# Patient Record
Sex: FEMALE | Race: WHITE | NOT HISPANIC OR LATINO | Employment: FULL TIME | URBAN - METROPOLITAN AREA
[De-identification: names, ages, dates, MRNs, and addresses within clinical notes are randomized per-mention and may not be internally consistent; named-entity substitution may affect disease eponyms.]

---

## 2017-10-26 ENCOUNTER — APPOINTMENT (OUTPATIENT)
Dept: WOMENS IMAGING | Facility: HOSPITAL | Age: 57
End: 2017-10-26

## 2017-10-26 PROCEDURE — 77063 BREAST TOMOSYNTHESIS BI: CPT | Performed by: RADIOLOGY

## 2017-10-26 PROCEDURE — 77067 SCR MAMMO BI INCL CAD: CPT | Performed by: RADIOLOGY

## 2020-09-23 ENCOUNTER — APPOINTMENT (OUTPATIENT)
Dept: WOMENS IMAGING | Facility: HOSPITAL | Age: 60
End: 2020-09-23

## 2020-09-23 PROCEDURE — 77067 SCR MAMMO BI INCL CAD: CPT | Performed by: RADIOLOGY

## 2021-03-22 ENCOUNTER — BULK ORDERING (OUTPATIENT)
Dept: CASE MANAGEMENT | Facility: OTHER | Age: 61
End: 2021-03-22

## 2021-03-22 DIAGNOSIS — Z23 IMMUNIZATION DUE: ICD-10-CM

## 2021-11-17 ENCOUNTER — APPOINTMENT (OUTPATIENT)
Dept: WOMENS IMAGING | Facility: HOSPITAL | Age: 61
End: 2021-11-17

## 2021-11-17 PROCEDURE — 77067 SCR MAMMO BI INCL CAD: CPT | Performed by: RADIOLOGY

## 2021-11-17 PROCEDURE — 77063 BREAST TOMOSYNTHESIS BI: CPT | Performed by: RADIOLOGY

## 2023-11-10 ENCOUNTER — PREP FOR SURGERY (OUTPATIENT)
Dept: OTHER | Facility: HOSPITAL | Age: 63
End: 2023-11-10
Payer: COMMERCIAL

## 2023-11-10 DIAGNOSIS — Z12.11 SCREENING FOR COLON CANCER: Primary | ICD-10-CM

## 2023-11-10 DIAGNOSIS — Z80.0 FAMILY HISTORY OF COLON CANCER: ICD-10-CM

## 2023-12-13 PROBLEM — Z80.0 FAMILY HISTORY OF COLON CANCER: Status: ACTIVE | Noted: 2023-11-10

## 2023-12-13 PROBLEM — Z12.11 SCREENING FOR COLON CANCER: Status: ACTIVE | Noted: 2023-11-10

## 2024-01-17 RX ORDER — BUDESONIDE AND FORMOTEROL FUMARATE DIHYDRATE 160; 4.5 UG/1; UG/1
2 AEROSOL RESPIRATORY (INHALATION) AS NEEDED
COMMUNITY

## 2024-01-17 RX ORDER — ALBUTEROL SULFATE 90 UG/1
AEROSOL, METERED RESPIRATORY (INHALATION)
COMMUNITY

## 2024-01-17 RX ORDER — PANTOPRAZOLE SODIUM 20 MG/1
20 TABLET, DELAYED RELEASE ORAL DAILY
COMMUNITY

## 2024-01-17 RX ORDER — ERGOCALCIFEROL 1.25 MG/1
50000 CAPSULE ORAL WEEKLY
COMMUNITY

## 2024-01-17 RX ORDER — CETIRIZINE HYDROCHLORIDE 10 MG/1
10 TABLET ORAL DAILY
COMMUNITY

## 2024-01-18 ENCOUNTER — ANESTHESIA EVENT (OUTPATIENT)
Dept: GASTROENTEROLOGY | Facility: HOSPITAL | Age: 64
End: 2024-01-18
Payer: COMMERCIAL

## 2024-01-18 ENCOUNTER — HOSPITAL ENCOUNTER (OUTPATIENT)
Facility: HOSPITAL | Age: 64
Setting detail: HOSPITAL OUTPATIENT SURGERY
Discharge: HOME | End: 2024-01-18
Attending: SURGERY | Admitting: SURGERY
Payer: COMMERCIAL

## 2024-01-18 ENCOUNTER — ANESTHESIA (OUTPATIENT)
Dept: GASTROENTEROLOGY | Facility: HOSPITAL | Age: 64
End: 2024-01-18
Payer: COMMERCIAL

## 2024-01-18 VITALS
BODY MASS INDEX: 25.95 KG/M2 | OXYGEN SATURATION: 96 % | HEIGHT: 64 IN | DIASTOLIC BLOOD PRESSURE: 75 MMHG | RESPIRATION RATE: 22 BRPM | WEIGHT: 152 LBS | SYSTOLIC BLOOD PRESSURE: 121 MMHG | HEART RATE: 71 BPM

## 2024-01-18 DIAGNOSIS — Z80.0 FAMILY HISTORY OF COLON CANCER: Primary | ICD-10-CM

## 2024-01-18 DIAGNOSIS — Z12.11 SCREENING FOR COLON CANCER: ICD-10-CM

## 2024-01-18 PROCEDURE — 88305 TISSUE EXAM BY PATHOLOGIST: CPT | Performed by: SURGERY

## 2024-01-18 PROCEDURE — 45380 COLONOSCOPY AND BIOPSY: CPT | Performed by: SURGERY

## 2024-01-18 PROCEDURE — 45385 COLONOSCOPY W/LESION REMOVAL: CPT | Performed by: SURGERY

## 2024-01-18 PROCEDURE — 25810000003 LACTATED RINGERS PER 1000 ML: Performed by: SURGERY

## 2024-01-18 PROCEDURE — S0260 H&P FOR SURGERY: HCPCS | Performed by: SURGERY

## 2024-01-18 PROCEDURE — 25010000002 PROPOFOL 10 MG/ML EMULSION: Performed by: NURSE ANESTHETIST, CERTIFIED REGISTERED

## 2024-01-18 PROCEDURE — 25810000003 LACTATED RINGERS PER 1000 ML: Performed by: NURSE ANESTHETIST, CERTIFIED REGISTERED

## 2024-01-18 RX ORDER — PROPOFOL 10 MG/ML
VIAL (ML) INTRAVENOUS AS NEEDED
Status: DISCONTINUED | OUTPATIENT
Start: 2024-01-18 | End: 2024-01-18 | Stop reason: SURG

## 2024-01-18 RX ORDER — SODIUM CHLORIDE, SODIUM LACTATE, POTASSIUM CHLORIDE, CALCIUM CHLORIDE 600; 310; 30; 20 MG/100ML; MG/100ML; MG/100ML; MG/100ML
INJECTION, SOLUTION INTRAVENOUS CONTINUOUS PRN
Status: DISCONTINUED | OUTPATIENT
Start: 2024-01-18 | End: 2024-01-18 | Stop reason: SURG

## 2024-01-18 RX ORDER — SODIUM CHLORIDE, SODIUM LACTATE, POTASSIUM CHLORIDE, CALCIUM CHLORIDE 600; 310; 30; 20 MG/100ML; MG/100ML; MG/100ML; MG/100ML
1000 INJECTION, SOLUTION INTRAVENOUS CONTINUOUS
Status: DISCONTINUED | OUTPATIENT
Start: 2024-01-18 | End: 2024-01-18 | Stop reason: HOSPADM

## 2024-01-18 RX ORDER — LIDOCAINE HYDROCHLORIDE 20 MG/ML
INJECTION, SOLUTION INFILTRATION; PERINEURAL AS NEEDED
Status: DISCONTINUED | OUTPATIENT
Start: 2024-01-18 | End: 2024-01-18 | Stop reason: SURG

## 2024-01-18 RX ADMIN — PROPOFOL 200 MCG/KG/MIN: 10 INJECTION, EMULSION INTRAVENOUS at 10:00

## 2024-01-18 RX ADMIN — SODIUM CHLORIDE, POTASSIUM CHLORIDE, SODIUM LACTATE AND CALCIUM CHLORIDE: 600; 310; 30; 20 INJECTION, SOLUTION INTRAVENOUS at 09:55

## 2024-01-18 RX ADMIN — LIDOCAINE HYDROCHLORIDE 60 MG: 20 INJECTION, SOLUTION INFILTRATION; PERINEURAL at 10:00

## 2024-01-18 RX ADMIN — PROPOFOL 100 MG: 10 INJECTION, EMULSION INTRAVENOUS at 10:00

## 2024-01-18 RX ADMIN — SODIUM CHLORIDE, POTASSIUM CHLORIDE, SODIUM LACTATE AND CALCIUM CHLORIDE 1000 ML: 600; 310; 30; 20 INJECTION, SOLUTION INTRAVENOUS at 09:18

## 2024-01-18 NOTE — ANESTHESIA POSTPROCEDURE EVALUATION
Patient: Ellen Parker    Procedure Summary       Date: 01/18/24 Room / Location: [facility] ENDOSCOPY 5 / [facility] ENDOSCOPY    Anesthesia Start: 0953 Anesthesia Stop: 1024    Procedure: COLONOSCOPY to cecum with hot and cold polypectomies Diagnosis:       Screening for colon cancer      Family history of colon cancer      (Screening for colon cancer [Z12.11])      (Family history of colon cancer [Z80.0])    Surgeons: Jeannette Bang MD Provider: Marcel Ervin MD    Anesthesia Type: MAC ASA Status: 2            Anesthesia Type: MAC    Vitals  Vitals Value Taken Time   /71 01/18/24 1033   Temp     Pulse 73 01/18/24 1038   Resp 20 01/18/24 1032   SpO2 94 % 01/18/24 1038   Vitals shown include unfiled device data.        Post Anesthesia Care and Evaluation    Patient location during evaluation: PACU  Patient participation: complete - patient participated  Level of consciousness: awake and alert  Pain management: adequate    Airway patency: patent  Anesthetic complications: No anesthetic complications    Cardiovascular status: acceptable  Respiratory status: acceptable  Hydration status: acceptable    Comments: --------------------            01/18/24               1032     --------------------   BP:       117/71     Pulse:      74       Resp:       20       SpO2:      96%      --------------------

## 2024-01-18 NOTE — DISCHARGE INSTRUCTIONS
For the next 24 hours patient needs to be with a responsible adult.    For 24 hours DO NOT drive, operate machinery, appliances, drink alcohol, make important decisions or sign legal documents.    Start with a light or bland diet if you are feeling sick to your stomach otherwise advance to regular diet as tolerated.    Follow recommendations on procedure report if provided by your doctor.    Call Dr Bang for problems [phone].  Office will call with biopsy results in 2 weeks.    Problems may include but not limited to: large amounts of bleeding, trouble breathing, repeated vomiting, severe unrelieved pain, fever or chills.

## 2024-01-18 NOTE — OP NOTE
Operative Note :  Jeannette Bang MD      Ellen Sargentments  1960    Procedure Date: 01/18/24    Pre-op Diagnosis:  Screening for colon cancer [Z12.11]  Family history of colon cancer [Z80.0]    Post-Operative Diagnosis:  Colon polyps  Diverticulosis    Procedure:   Flexible colonoscopy to the cecum with hot snare polypectomy and cold forcep polypectomy    Surgeon: Jeannette Bang MD    Assistant: None    Anesthesia:  MAC (monitored anesthetic care)    Estimated Blood Loss: Minimal    Specimens:   Sigmoid colon polyp  Rectal polyp    Complications: None    Indications:  The patient is a 63-year-old lady who presents today for repeat screening colonoscopy.  She has a strong family history of colon cancer in her mother, brother, maternal aunt, and multiple maternal cousins.  I have therefore sent a referral to our genetics clinic for her to be tested for Birch syndrome.  She has been counseled on the risk of colonoscopy today and has consented to proceed.    Findings: 2 sessile colon polyps removed, diverticulosis    Description of procedure:  The patient was brought to the endoscopy suite and candelaria in the left lateral decubitus position.  Continuous propofol anesthesia was administered.  A surgical timeout was completed.  A digital rectal exam was performed, revealing no abnormalities.  An adult colonoscope was then inserted through the anus and passed under direct visualization to the level of the cecum.  The cecum was identified via the ileocecal valve as well as the appendiceal orifice.  The scope was then slowly withdrawn, examining all circumferential walls of the ascending, transverse, descending, and sigmoid colon.  She had diverticulosis of the sigmoid colon with no evidence of bleeding or fecal impaction.  There was a 4 mm polyp of the sigmoid colon removed using the hot snare.  There was a 3 mm polyp of the rectum removed using the biopsy forceps.  Within the rectum the scope was retroflexed and  showed no evidence of hemorrhoidal disease.  The scope was then withdrawn and the colon desufflated.  The patient had a very good bowel prep and was transferred to the recovery area in stable condition.     Recommendations:  I will call the patient in 1 week or less with the pathology results of the 2 polyps removed as this will determine when the next screening colonoscopy will be due.  I have also referred her to our genetics clinic to be evaluated for potential Birch syndrome given her strong family history of colon cancer in many maternal family members.    Jeannette Bang MD  General, Robotic, and Endoscopic Surgery  [facility]    [address], [address]  [place] [address]  P: [phone]  F: [phone]

## 2024-01-18 NOTE — ANESTHESIA PREPROCEDURE EVALUATION
Anesthesia Evaluation     Patient summary reviewed and Nursing notes reviewed                Airway   Mallampati: II  TM distance: <3 FB  Neck ROM: full  No difficulty expected  Dental      Pulmonary    (+) a smoker Former, asthma,  Cardiovascular - negative cardio ROS    Rhythm: regular  Rate: normal        Neuro/Psych- negative ROS  GI/Hepatic/Renal/Endo    (+) GERD    Musculoskeletal (-) negative ROS    Abdominal    Substance History   (+) alcohol use     OB/GYN negative ob/gyn ROS         Other                      Anesthesia Plan    ASA 2     MAC     intravenous induction     Anesthetic plan, risks, benefits, and alternatives have been provided, discussed and informed consent has been obtained with: patient.    Plan discussed with CRNA.    CODE STATUS:

## 2024-01-18 NOTE — H&P
General Surgery  History and Physical    CC: Screening for colon cancer    HPI: The patient is a pleasant 63 y.o. year-old lady who presents today for a repeat screening colonoscopy.  Her last colonoscopy was performed in 2013 by Dr. Venu Sue and asmita.  She has a strong family history of colon cancer in her mother, brother, multiple maternal cousins, and one maternal aunt.  She denies any melena or hematochezia.      Past Medical History:   GERD  Asthma    Past Surgical History:   Colonoscopy (2013, Dr. Venu Sue-asmita)  Hysterectomy  Nasal polypectomy x 2    Medications:   Medications Prior to Admission   Medication Sig Dispense Refill Last Dose    cetirizine (zyrTEC) 10 MG tablet Take 1 tablet by mouth Daily.   1/17/2024    pantoprazole (PROTONIX) 20 MG EC tablet Take 1 tablet by mouth Daily.   Past Week    vitamin D (ERGOCALCIFEROL) 1.25 MG (28354 UT) capsule capsule Take 1 capsule by mouth 1 (One) Time Per Week.   Past Week    albuterol sulfate  (90 Base) MCG/ACT inhaler    More than a month    budesonide-formoterol (SYMBICORT) 160-4.5 MCG/ACT inhaler Inhale 2 puffs As Needed.   More than a month       Allergies: Morphine    Family History: Multiple family members with history of colon cancer including her mother (age 62), brother (age 54 and Stave IV), maternal cousins, and a maternal aunt    Social History: , former smoker, social alcohol use    ROS: A comprehensive review of systems was conducted and negative for melena or hematochezia  All other systems reviewed and negative    Physical Exam:  Vitals:    01/18/24 0910   BP: 123/68   Pulse: 72   Resp: 16   SpO2: 94%     Height: 162 cm  Weight: 68 kg  BMI: 26.0  General: No acute distress, well-nourished & well-developed  HEAD: normocephalic, atraumatic  EYES: normal conjunctiva, sclera anicteric  EARS: grossly normal hearing  NECK: supple, no thyromegaly  CARDIOVASCULAR: regular rate and rhythm  RESPIRATORY: clear to auscultation  bilaterally  GASTROINTESTINAL: soft, nontender, non-distended  PSYCHIATRIC: oriented x3, normal mood and affect    ASSESSMENT & PLAN  Mrs. Parker is a 63-year-old lady here for routine screening colonoscopy.  She has been counseled on the risks of the procedure to include bleeding, colon perforation, and missed pathology.  Despite these risks, she has consented to proceed.  Due to her strong family history of colon cancer in multiple maternal family members, I have referred her to our genetics clinic to be tested for Birch syndrome.    Jeannette Bang MD  General, Robotic, and Endoscopic Surgery  [facility]    [address], [address]  [place] [address]  P: [phone]  F: [phone]

## 2024-01-19 LAB
LAB AP CASE REPORT: NORMAL
PATH REPORT.FINAL DX SPEC: NORMAL
PATH REPORT.GROSS SPEC: NORMAL

## 2024-01-22 ENCOUNTER — TELEPHONE (OUTPATIENT)
Dept: SURGERY | Facility: CLINIC | Age: 64
End: 2024-01-22
Payer: COMMERCIAL

## 2024-01-22 NOTE — TELEPHONE ENCOUNTER
I called Mrs. Parker and discussed the benign pathology findings from her colonoscopy last week.  Both polyps returned benign, with 1 showing adenomatous growth.  That coupled with her family history of colon cancer in her mother and multiple other maternal family members warrants a repeat screening colonoscopy in 5 years.  She also understands if her upcoming genetics appointment in March reveals any genetic defect that supports a diagnosis of Birch syndrome, she would need to have colonoscopies done yearly.  Until we get those results back, I would stick with a 5-year interval between scopes.  She expressed understanding.    Tatum, can you update her health maintenance tab and recall pool to reflect a 5-year interval?    Thanks,  DANITZA

## 2024-03-14 ENCOUNTER — TELEPHONE (OUTPATIENT)
Dept: GENETICS | Facility: HOSPITAL | Age: 64
End: 2024-03-14
Payer: COMMERCIAL

## 2024-03-14 NOTE — TELEPHONE ENCOUNTER
Called pt to remind her of her upcoming genetic counseling appt tomorrow. Pt plans to email me her paper family hx questionnaire.

## 2024-03-15 ENCOUNTER — CLINICAL SUPPORT (OUTPATIENT)
Dept: GENETICS | Facility: HOSPITAL | Age: 64
End: 2024-03-15
Payer: COMMERCIAL

## 2024-03-15 DIAGNOSIS — Z80.51 FAMILY HISTORY OF KIDNEY CANCER: ICD-10-CM

## 2024-03-15 DIAGNOSIS — Z80.3 FAMILY HISTORY OF BREAST CANCER: ICD-10-CM

## 2024-03-15 DIAGNOSIS — Z13.79 GENETIC TESTING: Primary | ICD-10-CM

## 2024-03-15 DIAGNOSIS — Z80.1 FAMILY HISTORY OF LUNG CANCER: ICD-10-CM

## 2024-03-15 DIAGNOSIS — Z80.8 FAMILY HISTORY OF MELANOMA: ICD-10-CM

## 2024-03-15 DIAGNOSIS — Z80.0 FAMILY HISTORY OF COLON CANCER: ICD-10-CM

## 2024-03-15 PROCEDURE — 96040: CPT

## 2024-03-15 NOTE — PROGRESS NOTES
Ellen Parker, a 63 y.o. female, was referred for genetic counseling due to a family history of colon cancer. Ms. Parker has no personal history of cancer. she was 13 at menarche and had her first child at age 18. she is post-menopausal since age 43 and retains her ovaries. She had her uterus removed at age 25 due to heavy menses.  Ms. Parker has annual mammograms and reports no prior breast biopsies. she has colonoscopies every 5 years and reports a history of 2 polyps, one of which was an adenoma.  she was interested in discussing her risk for a hereditary cancer syndrome. Ms. Parker decided to pursue comprehensive genetic testing to evaluate her risk of cancer, therefore the Multi-Cancer Panel was ordered through [facility] ([facility]) which analyzes 70 genes associated with an increased cancer risk. her blood was drawn on 3/15/2024. Results are expected 2-3 weeks after the lab receives her sample.     PERTINENT FAMILY HISTORY: (See attached pedigree)   Daughter:    Melanoma, 20s  Niece:      Breast cancer, 2 different types, metastatic, 39  Brother 1:     Lung cancer, 59  Brother 2:     Colon cancer, 53       Kidney cancer, 54  Sister:      Amaya's sarcoma, 14  Pat. Uncle and Aunt:    Unknown cancer, passed below age 10  Mother:     Lung cancer, 62, metastatic  Mat. Uncle:     Lung cancer, 60s  Mat. Aunt 1:     Colon cancer, 50s  Mat. Aunt 2:     Lung cancer, metastatic  Mat. Cousin 1:    Breast and colon cancer, diagnosed late 50s-early 60s  Mat. Cousin 2:    Colon cancer, 60s  Mat. Cousin 3:    Colon cancer, 60s  Mat. 1st Cousin Once Removed:  Breast cancer  Mat. Grandmother:    Unknown cancer, passed in late 50s    We do not have medical records regarding any of these diagnoses.     RISK ASSESSMENT:  Ms. Parker's family history of  cancer raises the question of a hereditary cancer syndrome. NCCN guidelines for genetic testing for BRCA1/2 states that individuals with a close relative diagnosed with breast cancer  below the age of 50 may consider genetic testing. Based on Ms. Parker's niece's diagnosis of breast cancer at age 39, she would clearly meet this criteria. This risk assessment is based on the family history information provided at the time of the appointment.  The assessment could change in the future should new information be obtained.    GENETIC COUNSELING (30 minutes):  We reviewed the family history information in detail. Cases of cancer follow three general patterns: sporadic, familial, and hereditary.  While most cancer is sporadic, some cases appear to occur in family clusters.  These cases are said to be familial and account for 10-20% of cancer cases.  Familial cases may be due to a combination of shared genes and environmental factors among family members.  In even fewer cases, the risk for cancer is inherited, and the genes responsible for the increased cancer risk are known.       Family histories typical of hereditary cancer syndromes usually include multiple first- and second-degree relatives diagnosed with cancer types that define a syndrome.  These cases tend to be diagnosed at younger-than-expected ages and can be bilateral or multifocal.  The cancer in these families follows an autosomal dominant inheritance pattern, which indicates the likely presence of a mutation in a cancer susceptibility gene.  Children and siblings of an individual believed to carry this mutation have a 50% chance of inheriting that mutation, thereby inheriting the increased risk to develop cancer.  These mutations can be passed down from the maternal or the paternal lineage.     We discussed that Birch syndrome is the most common form of hereditary colon cancer. It is an autosomal dominant condition caused by mutations in mismatch repair genes.  The lifetime risk for colon cancer for individuals with Birch syndrome is up to 80% if there is no intervention (i.e. removal of polyps detected on colonoscopy).  Frequently  (60-70% of the time) the polyps and colon cancer in Birch syndrome arise on the right side of the colon. Routine screening colonoscopy has been shown to significantly reduce the incidence and mortality of colon cancer in families with Birch syndrome. Other risks include endometrial cancer (up to 60%), as well as other cancers (ovarian, upper GI, brain, stomach, pancreatic). We discussed the national management guidelines that have been established for individuals known to have Birch syndrome.    Due to Ms. Parker's family history of breast cancer, we discussed hereditary breast cancer. Hereditary breast cancer accounts for 5-10% of all cases of breast cancer.  A significant proportion of hereditary breast cancer can be attributed to mutations in the BRCA1 and BRCA2 genes.  Mutations in these genes confer an increased risk for breast cancer, ovarian cancer, male breast cancer, prostate cancer, and pancreatic cancer.  Women with a BRCA1 or BRCA2 mutation have up to an 87% lifetime risk of breast cancer and up to a 60% risk of ovarian cancer.  There are other clinically significant breast cancer related genes in addition to BRCA1/2.      There are other genes that are known to be associated with an increased risk for cancer.  Some of these genes have well defined cancer risks and established management guidelines. Other genes that can be tested for have been more recently described, and there may be less data regarding the risks and therefore may not have established management guidelines. We discussed these limitations at length. Based on Ms. Parker's desire to get as much information as possible regarding her personal risks and potential risks for her family, she opted to pursue testing through a panel that would look at several other genes known to increase the risk for cancer.     GENETIC TESTING:  The risks, benefits, and limitations of genetic testing and implications for clinical management following testing  were reviewed. DNA test results can influence decisions regarding screening and prevention. Genetic testing can have significant psychological implications for both individuals and families. Also discussed was the possibility of employment and insurance discrimination based on genetic test results and the laws in place to prevent this, as well as the limitations of these laws.       We discussed panel testing, which would involve testing 70 genes associated with increased cancer risk. The implications of a positive or negative test result were discussed.  We also discussed the importance of testing an affected relative and how a negative result for Ms. Parker wouldn't necessarily mean the cancers presenting in her family weren't due to a genetic mutation that Ms. Parker did not inherit.  In general, a negative genetic test result is most informative if a mutation has first been established in an affected member of the family.  In cases where an affected individual is not available or interested in testing, it is appropriate to offer testing to an unaffected individual.     We discussed the possibility that, in some cases, genetic test results may be ambiguous due to the identification of a genetic variant of uncertain significance (VUS). These variants may or may not be associated with an increased cancer risk. With multigene panel testing, it is not uncommon for a VUS to be identified.  If a VUS is identified, testing family members is typically not recommended and screening recommendations are made based on the family history.  The laboratories that perform genetic testing work to reclassify the VUS and send out an amended report if and when a VUS is reclassified.  The majority of variant findings are ultimately reclassified to a negative result. Given Ms. Parker's family history, a negative test result does not eliminate all cancer risk, although the risk may not be as high as it would with positive genetic  testing.     PLAN: Genetic testing was ordered via the Multi-Cancer Panel through [facility]. her blood was drawn 3/15/2024 for testing. Results are expected 2-3 weeks after the lab receives her sample. If she has any questions in the meantime, she is welcome to call me at [phone].      Glo Ku MS, [facility]  Licensed Certified Genetic Counselor

## 2024-03-25 ENCOUNTER — TELEPHONE (OUTPATIENT)
Dept: GENETICS | Facility: HOSPITAL | Age: 64
End: 2024-03-25
Payer: COMMERCIAL

## 2024-03-26 ENCOUNTER — DOCUMENTATION (OUTPATIENT)
Dept: GENETICS | Facility: HOSPITAL | Age: 64
End: 2024-03-26
Payer: COMMERCIAL

## 2024-03-26 ENCOUNTER — TELEPHONE (OUTPATIENT)
Dept: GENETICS | Facility: HOSPITAL | Age: 64
End: 2024-03-26
Payer: COMMERCIAL

## 2024-03-26 NOTE — PROGRESS NOTES
Ellen Parker, a 63 y.o. female, was referred for genetic counseling due to a family history of colon cancer. Ms. Parker has no personal history of cancer. she was 13 at menarche and had her first child at age 18. she is post-menopausal since age 43 and retains her ovaries. She had her uterus removed at age 25 due to heavy menses.  Ms. Parker has annual mammograms and reports no prior breast biopsies. she has colonoscopies every 5 years and reports a history of 2 polyps, one of which was an adenoma.  she was interested in discussing her risk for a hereditary cancer syndrome. Ms. Parker decided to pursue comprehensive genetic testing to evaluate her risk of cancer, therefore the Multi-Cancer Panel was ordered through [employer] which analyzes 70 genes associated with an increased cancer risk. Genetic testing was negative for known pathogenic mutations in the 70 genes on this panel. These normal results were discussed with Ms. Parker via telephone on 3/26/2024.     PERTINENT FAMILY HISTORY: (See attached pedigree)   Daughter:                                            Melanoma, 20s  Niece:                                                  Breast cancer, 2 different types, metastatic, 39  Brother 1:                                            Lung cancer, 59  Brother 2:                                            Colon cancer, 53                                                              Kidney cancer, 54  Sister:                                                  Amaya's sarcoma, 14  Pat. Uncle and Aunt:                           Unknown cancer, passed below age 10  Mother:                                                Lung cancer, 62, metastatic  Mat. Uncle:                                          Lung cancer, 60s  Mat. Aunt 1:                                         Colon cancer, 50s  Mat. Aunt 2:                                         Lung cancer, metastatic  Mat. Cousin 1:                                      Breast and colon cancer, diagnosed late 50s-early 60s  Mat. Cousin 2:                                     Colon cancer, 60s  Mat. Cousin 3:                                     Colon cancer, 60s  Mat. 1st Cousin Once Removed:        Breast cancer  Mat. Grandmother:                              Unknown cancer, passed in late 50s     We do not have medical records regarding any of these diagnoses.      RISK ASSESSMENT:  Ms. Cedenos family history of  cancer raises the question of a hereditary cancer syndrome. NCCN guidelines for genetic testing for BRCA1/2 states that individuals with a close relative diagnosed with breast cancer below the age of 50 may consider genetic testing. Based on Ms. Pakrer's niece's diagnosis of breast cancer at age 39, she would clearly meet this criteria. This risk assessment is based on the family history information provided at the time of the appointment.  The assessment could change in the future should new information be obtained.    Because genetic testing was negative, Ms. Cedenos management should be guided by a family history-based risk assessment.  Nervana System[employer]-imojizick, version 8 is able to take into account personal factors (age at menarche, age at first birth, etc.) and family history when calculating risk for breast cancer. Computer modeling estimates that Ms. Cedenos lifetime personal risk for developing breast cancer is up to 3.1% (Daija-Kamranzick, v8), in comparison to the average 63-year-old female's risk of 6.4%. Per NCCN guidelines, a lifetime risk above 20% is considered to be “high risk” where increased screening is warranted; Ms. Ceballos risk does not fall into that category.  This risk assessment is based on the family history information provided at the time of the appointment and could change in the future should new information be obtained.     GENETIC COUNSELING (30 minutes):  We reviewed the family history information in detail. Cases of cancer follow three  general patterns: sporadic, familial, and hereditary.  While most cancer is sporadic, some cases appear to occur in family clusters.  These cases are said to be familial and account for 10-20% of cancer cases.  Familial cases may be due to a combination of shared genes and environmental factors among family members.  In even fewer cases, the risk for cancer is inherited, and the genes responsible for the increased cancer risk are known.       Family histories typical of hereditary cancer syndromes usually include multiple first- and second-degree relatives diagnosed with cancer types that define a syndrome.  These cases tend to be diagnosed at younger-than-expected ages and can be bilateral or multifocal.  The cancer in these families follows an autosomal dominant inheritance pattern, which indicates the likely presence of a mutation in a cancer susceptibility gene.  Children and siblings of an individual believed to carry this mutation have a 50% chance of inheriting that mutation, thereby inheriting the increased risk to develop cancer.  These mutations can be passed down from the maternal or the paternal lineage.     We discussed that Birch syndrome is the most common form of hereditary colon cancer. It is an autosomal dominant condition caused by mutations in mismatch repair genes.  The lifetime risk for colon cancer for individuals with Birch syndrome is up to 80% if there is no intervention (i.e. removal of polyps detected on colonoscopy).  Frequently (60-70% of the time) the polyps and colon cancer in Birch syndrome arise on the right side of the colon. Routine screening colonoscopy has been shown to significantly reduce the incidence and mortality of colon cancer in families with Birch syndrome. Other risks include endometrial cancer (up to 60%), as well as other cancers (ovarian, upper GI, brain, stomach, pancreatic). We discussed the national management guidelines that have been established for individuals  known to have Birch syndrome.     Due to Ms. Parker's family history of breast cancer, we discussed hereditary breast cancer. Hereditary breast cancer accounts for 5-10% of all cases of breast cancer.  A significant proportion of hereditary breast cancer can be attributed to mutations in the BRCA1 and BRCA2 genes.  Mutations in these genes confer an increased risk for breast cancer, ovarian cancer, male breast cancer, prostate cancer, and pancreatic cancer.  Women with a BRCA1 or BRCA2 mutation have up to an 87% lifetime risk of breast cancer and up to a 60% risk of ovarian cancer.  There are other clinically significant breast cancer related genes in addition to BRCA1/2.       There are other genes that are known to be associated with an increased risk for cancer.  Some of these genes have well defined cancer risks and established management guidelines. Other genes that can be tested for have been more recently described, and there may be less data regarding the risks and therefore may not have established management guidelines. We discussed these limitations at length. Based on Ms. Parker's desire to get as much information as possible regarding her personal risks and potential risks for her family, she opted to pursue testing through a panel that would look at several other genes known to increase the risk for cancer.     GENETIC TESTING:  The risks, benefits, and limitations of genetic testing and implications for clinical management following testing were reviewed. DNA test results can influence decisions regarding screening and prevention. Genetic testing can have significant psychological implications for both individuals and families. Also discussed was the possibility of employment and insurance discrimination based on genetic test results and the laws in place to prevent this, as well as the limitations of these laws.       We discussed panel testing, which would involve testing 70 genes associated  with increased cancer risk. The implications of a positive or negative test result were discussed.  We also discussed the importance of testing an affected relative and how a negative result for Ms. Parker wouldn't necessarily mean the cancers presenting in her family weren't due to a genetic mutation that Ms. Parker did not inherit.  In general, a negative genetic test result is most informative if a mutation has first been established in an affected member of the family.  In cases where an affected individual is not available or interested in testing, it is appropriate to offer testing to an unaffected individual.      We discussed the possibility that, in some cases, genetic test results may be ambiguous due to the identification of a genetic variant of uncertain significance (VUS). These variants may or may not be associated with an increased cancer risk. With multigene panel testing, it is not uncommon for a VUS to be identified.  If a VUS is identified, testing family members is typically not recommended and screening recommendations are made based on the family history.  The laboratories that perform genetic testing work to reclassify the VUS and send out an amended report if and when a VUS is reclassified.  The majority of variant findings are ultimately reclassified to a negative result. Given Ms. Parker's family history, a negative test result does not eliminate all cancer risk, although the risk may not be as high as it would with positive genetic testing.      TEST RESULTS:  Genetic testing was negative for known pathogenic mutations by sequencing, rearrangement testing, and RNA analysis for the 70 genes included on the Multi-Cancer panel.   This negative result greatly lowers but does not eliminate the risk of a hereditary cancer syndrome for Ms. Parker. This assessment is based on the information provided at the time of consultation and could change should new information be obtained regarding  his personal and/or family history.     CANCER PREVENTION: Options available to individuals with an elevated lifetime risk for breast cancer were briefly discussed, including increased surveillance and chemoprevention.  Based on computer modeling, Ms. Parker's lifetime risk for breast cancer would not be considered “high risk”. Per NCCN guidelines, it is appropriate for her to follow general population screening guidelines for her breast cancer risk, including annual clinical breast exam and annual mammogram beginning at age 40. This assessment is based on the information provided at the time of the consultation and could change should new information become available regarding the family history.    For her family history of colon cancer, current NCCN guidelines recommend that individuals who have a first-degree relative diagnosed with colorectal cancer at any age should begin colonoscopy screening at age 40 or ten years before the earliest diagnosis of colorectal cancer in the family, whichever is earliest, and repeat every five years or more frequently based on personal clinical findings.    PLAN:  Genetic counseling remains available to Ms. Parker and her family.  Ms. Parker is welcome to contact us with any questions in the future at [phone].     Glo Ku MS, [facility] [place]  Licensed Certified Genetic Counselor    Cc: Jeannette Aleman MD

## 2024-03-26 NOTE — TELEPHONE ENCOUNTER
Spoke with patient and disclosed negative genetic results. Informed patient these results would be on [employer] and sent to her Dr. Patient requested a copy be mailed to her.

## 2024-11-05 ENCOUNTER — APPOINTMENT (OUTPATIENT)
Dept: WOMENS IMAGING | Facility: HOSPITAL | Age: 64
End: 2024-11-05
Payer: COMMERCIAL

## 2024-11-05 PROCEDURE — 77061 BREAST TOMOSYNTHESIS UNI: CPT | Performed by: RADIOLOGY

## 2024-11-05 PROCEDURE — G0279 TOMOSYNTHESIS, MAMMO: HCPCS | Performed by: RADIOLOGY

## 2024-11-05 PROCEDURE — 77065 DX MAMMO INCL CAD UNI: CPT | Performed by: RADIOLOGY

## 2024-11-05 PROCEDURE — 76642 ULTRASOUND BREAST LIMITED: CPT | Performed by: RADIOLOGY

## (undated) DEVICE — ERBE NESSY®PLATE 170 SPLIT; 168CM²; CABLE 3M: Brand: ERBE

## (undated) DEVICE — KT ORCA ORCAPOD DISP STRL

## (undated) DEVICE — THE SINGLE USE ETRAP – POLYP TRAP IS USED FOR SUCTION RETRIEVAL OF ENDOSCOPICALLY REMOVED POLYPS.: Brand: ETRAP

## (undated) DEVICE — CANN O2 ETCO2 FITS ALL CONN CO2 SMPL A/ 7IN DISP LF

## (undated) DEVICE — TUBING, SUCTION, 1/4" X 10', STRAIGHT: Brand: MEDLINE

## (undated) DEVICE — SNAR POLYP SENSATION STDOVL 27 240 BX40

## (undated) DEVICE — ADAPT CLN BIOGUARD AIR/H2O DISP

## (undated) DEVICE — GOWN,SIRUS,NON REINFRCD,LARGE,SET IN SL: Brand: MEDLINE

## (undated) DEVICE — SENSR O2 OXIMAX FNGR A/ 18IN NONSTR